# Patient Record
(demographics unavailable — no encounter records)

---

## 2025-07-02 NOTE — REVIEW OF SYSTEMS
[see HPI] : see HPI [Hesitancy] : urinary hesitancy [Nocturia] : nocturia [Negative] : Heme/Lymph [Dysuria] : no dysuria [Incontinence] : no incontinence

## 2025-07-02 NOTE — ASSESSMENT
[FreeTextEntry1] : 70  y.o gentleman with recent UR episode after consuming beers and noted difficulty to void x9 hours, Abdominal pain , was at ED 6/29/25 with   ml on POCUS and martines catheter placed. He was started on Tamsulosin and has been doing well with his martines cath .Today he is here for a TOV. Denies constipation.  Fluids -2 glasses of water, sodas, beers Works as a  and tends to hold in urine based on WORK. Prior voids X 3-4 times DTF and nocturia X 1 Prior PSA normal as per pt Pt has not seen a PCP since 2010    Continue Flomax 0.4  mg po daily at HS  Re establish PCP care RTO in 2 weeks for PVR and ss check after Renal and Bladder US and prostate US DONE. Fluids and ED precautions reviewed.  [Urinary Symptom or Sign (788.99\R39.89)] : implantation

## 2025-07-02 NOTE — HISTORY OF PRESENT ILLNESS
[FreeTextEntry1] : 70  y.o gentleman with recent UR episode after consuming beers and noted difficulty to void x9 hours, Abdominal pain , was at ED 6/29/25 with   ml on POCUS and martines catheter placed. He was started on Tamsulosin and has been doing well with his martines cath .Today he is here for a TOV. Denies constipation.  Fluids -2 glasses of water, sodas, beers Works as a  and tends to hold in urine based on WORK. Prior voids X 3-4 times DTF and nocturia X 1 Prior PSA normal as per pt Pt has not seen a PCP since 2010   PMH - Denied PSH- denied FMH- denied any cancer hx in family SEE TOV note  Continue Flomax 0.4  mg po daily at HS  Re establish PCP care RTO in 2 weeks for PVR and ss check after Renal and Bladder US and prostate US DONE. Fluids and ED precautions reviewed.

## 2025-07-28 NOTE — REVIEW OF SYSTEMS
[see HPI] : see HPI [Dysuria] : no dysuria [Incontinence] : no incontinence [Hesitancy] : urinary hesitancy [Nocturia] : nocturia [Negative] : Heme/Lymph

## 2025-07-28 NOTE — ASSESSMENT
[FreeTextEntry1] : 70 y.o gentleman with PMH of UR was at ED 6/29/25 with  ml on POCUS and martines catheter placed. He was started on Tamsulosin 0.4 mg 6/29/25 Pt passed TOV 7/2/25 but returned to office with UR 7/3/25 and martines Cath placed and passed TOV 7/10/25 Today pt reports happier with voiding pattern. Voids DTF Q 2-3 H and nocturia x 1- with improved urinary stream and flow. He had his Renal US and prostate US performed this morning and awaiting read of images, nothing posted in all scripts. He is doing well with Tamsulosin and has made significant changes to his fluid's intake.  Fluids - 16 oz X 4, 1 coffee  Denies constipation  uroflow suboptimal 69 ml VV  PVR 13 ML  psa total and free today Continue Tamsulosin 0.4  mg po at HS  Will do Telehealth /RPA to discuss US results  RTO in 3 months for Uroflow and PVR

## 2025-07-28 NOTE — PHYSICAL EXAM
[Normal Appearance] : normal appearance [Well Groomed] : well groomed [General Appearance - In No Acute Distress] : no acute distress [Edema] : no peripheral edema [Respiration, Rhythm And Depth] : normal respiratory rhythm and effort [Exaggerated Use Of Accessory Muscles For Inspiration] : no accessory muscle use [Abdomen Soft] : soft [Abdomen Tenderness] : non-tender [Costovertebral Angle Tenderness] : no ~M costovertebral angle tenderness [Urinary Bladder Findings] : the bladder was normal on palpation [Normal Station and Gait] : the gait and station were normal for the patient's age [] : no rash [No Focal Deficits] : no focal deficits [Oriented To Time, Place, And Person] : oriented to person, place, and time [Affect] : the affect was normal [Mood] : the mood was normal [de-identified] : NO  /AMANDA